# Patient Record
Sex: MALE | Race: OTHER | HISPANIC OR LATINO | URBAN - METROPOLITAN AREA
[De-identification: names, ages, dates, MRNs, and addresses within clinical notes are randomized per-mention and may not be internally consistent; named-entity substitution may affect disease eponyms.]

---

## 2024-07-30 ENCOUNTER — EMERGENCY (EMERGENCY)
Facility: HOSPITAL | Age: 39
LOS: 0 days | Discharge: HOME | End: 2024-07-30
Attending: STUDENT IN AN ORGANIZED HEALTH CARE EDUCATION/TRAINING PROGRAM
Payer: MEDICAID

## 2024-07-30 VITALS
HEART RATE: 79 BPM | DIASTOLIC BLOOD PRESSURE: 84 MMHG | SYSTOLIC BLOOD PRESSURE: 127 MMHG | TEMPERATURE: 98 F | RESPIRATION RATE: 14 BRPM | OXYGEN SATURATION: 99 %

## 2024-07-30 DIAGNOSIS — S82.51XD DISPLACED FRACTURE OF MEDIAL MALLEOLUS OF RIGHT TIBIA, SUBSEQUENT ENCOUNTER FOR CLOSED FRACTURE WITH ROUTINE HEALING: ICD-10-CM

## 2024-07-30 PROCEDURE — 99282 EMERGENCY DEPT VISIT SF MDM: CPT

## 2024-07-30 PROCEDURE — 99212 OFFICE O/P EST SF 10 MIN: CPT

## 2024-07-30 NOTE — ED PROVIDER NOTE - ATTENDING CONTRIBUTION TO CARE
39-year-old male presents to the ED for removal of 1 stitch from medial malleolus area status post fracture and sutures done at [facility]. exam stable vital sign.   CONSTITUTIONAL: well developed; in no acute distress  HEAD: normocephalic; atraumatic  EYES: no conjunctival injection, no scleral icterus  ENT: no nasal discharge; airway clear.  NECK: supple; non tender.  CARD: warm and well perfused, not tachycardic  RESP: breathing comfortably on RA, speaking in full sentences w/o distress  Abdomen: Soft, None Tender, None Distended   SKIN:  Healing scar noted on medial malleolus area with 1 visible suture.  ambulating with crutchs  NEURO: alert, oriented, motor and sensory grossly intact, speech nonslurred  PSYCH: calm, cooperative  will remove suture

## 2024-07-30 NOTE — ED PROVIDER NOTE - CHILD ABUSE FACILITY
-Continue to take multimodal pain medication regimen prescribed by orthopedics to continue managing pain.
[facility]

## 2024-07-30 NOTE — ED PROVIDER NOTE - PATIENT PORTAL LINK FT
You can access the FollowMyHealth Patient Portal offered by [facility] by registering at the following website: [email or web address]. By joining [facility]’s FollowMyHealth portal, you will also be able to view your health information using other applications (apps) compatible with our system.

## 2024-07-30 NOTE — ED PROVIDER NOTE - OBJECTIVE STATEMENT
39-year-old male presents to the ED for removal of 1 stitch from medial malleolus area status post fracture and sutures done at [facility].  All other stitches were removed a few days ago but patient states 1 was left due to wound bleeding.

## 2024-07-30 NOTE — ED PROVIDER NOTE - CLINICAL SUMMARY MEDICAL DECISION MAKING FREE TEXT BOX
39-year-old male presents to the ED for removal of 1 stitch from medial malleolus area status post fracture and sutures done at [facility]. exam stable vital sign. wound well healed. suture removed and discharged with return precautions

## 2024-07-30 NOTE — ED PROVIDER NOTE - PHYSICAL EXAMINATION
Physical Exam: VS reviewed.   Constitutional: Patient is well appearing, in no distress.   SKIN: No skin rash noted. Healing scar noted on medial malleolus area with 1 visible suture. No erythema, warmth or signs of infection. Moving extremity well

## 2024-07-30 NOTE — ED PROCEDURE NOTE - CPROC ED POST PROC CARE GUIDE1
instructed to f/u with ortho/Verbal/written post procedure instructions were given to patient/caregiver.

## 2024-08-01 NOTE — CHART NOTE - NSCHARTNOTEFT_GEN_A_CORE
[facility] MRN [id number] / No answer via  7/31 - TENZIN / No answer 8/1 - TENZIN    Specialty: orthopedic surgery